# Patient Record
Sex: FEMALE | Race: WHITE | NOT HISPANIC OR LATINO | URBAN - METROPOLITAN AREA
[De-identification: names, ages, dates, MRNs, and addresses within clinical notes are randomized per-mention and may not be internally consistent; named-entity substitution may affect disease eponyms.]

---

## 2023-05-17 ENCOUNTER — APPOINTMENT (OUTPATIENT)
Dept: URBAN - METROPOLITAN AREA CLINIC 186 | Age: 58
Setting detail: DERMATOLOGY
End: 2023-05-17

## 2023-05-17 DIAGNOSIS — Z41.9 ENCOUNTER FOR PROCEDURE FOR PURPOSES OTHER THAN REMEDYING HEALTH STATE, UNSPECIFIED: ICD-10-CM

## 2023-05-17 PROCEDURE — OTHER ADDITIONAL NOTES: OTHER

## 2023-05-17 PROCEDURE — OTHER COSMETIC CONSULTATION: THREAD LIFT: OTHER

## 2023-05-17 PROCEDURE — OTHER MIPS QUALITY: OTHER

## 2023-05-17 PROCEDURE — OTHER COSMETIC CONSULTATION: FILLERS: OTHER

## 2023-05-17 ASSESSMENT — LOCATION DETAILED DESCRIPTION DERM
LOCATION DETAILED: LEFT INFERIOR CENTRAL MALAR CHEEK
LOCATION DETAILED: RIGHT CENTRAL BUCCAL CHEEK
LOCATION DETAILED: LEFT INFERIOR MEDIAL MALAR CHEEK
LOCATION DETAILED: LEFT SUPERIOR MEDIAL MALAR CHEEK
LOCATION DETAILED: RIGHT LOWER CUTANEOUS LIP
LOCATION DETAILED: LEFT LOWER CUTANEOUS LIP
LOCATION DETAILED: RIGHT SUPERIOR MEDIAL MALAR CHEEK
LOCATION DETAILED: LEFT SUPERIOR MEDIAL MALAR CHEEK
LOCATION DETAILED: RIGHT SUPERIOR CENTRAL MALAR CHEEK
LOCATION DETAILED: LEFT CENTRAL BUCCAL CHEEK
LOCATION DETAILED: RIGHT INFERIOR MEDIAL MALAR CHEEK
LOCATION DETAILED: RIGHT INFERIOR CENTRAL MALAR CHEEK

## 2023-05-17 ASSESSMENT — LOCATION SIMPLE DESCRIPTION DERM
LOCATION SIMPLE: RIGHT LIP
LOCATION SIMPLE: LEFT CHEEK
LOCATION SIMPLE: RIGHT CHEEK
LOCATION SIMPLE: LEFT LIP
LOCATION SIMPLE: LEFT CHEEK
LOCATION SIMPLE: RIGHT CHEEK

## 2023-05-17 ASSESSMENT — LOCATION ZONE DERM
LOCATION ZONE: FACE
LOCATION ZONE: LIP
LOCATION ZONE: FACE

## 2023-05-17 NOTE — PROCEDURE: ADDITIONAL NOTES
Detail Level: Simple
Additional Notes: Patient wants to improve under eye bags. Discussed option for tear trough filler vs. facial plastic surgeon. Recommended plastic surgeon due to risks associated with tear trough filler. Pt request recommended plastic surgeon-Carlito Cook or Ines Owens-recommended pt have consult with more than one surgeon. Also discussed option for filler to marionette and hollowed temples-recommended doing this in addition to instalift
Render Risk Assessment In Note?: no
Additional Notes: Instalift 3 sutures per side $3500. Patient will consider options and get back to us if she decides to schedule
Additional Notes: We addressed the patient's main concern, bags under the eyes, nasolabial folds and jowling. We discussed the reasons, remedies from dermal filler and repostioning with microsuture technoloogy v face lift and dermal  tear trough and lateral soof v blepharoplasty.  We suggested Malachi Snyder or Ines Owens for the blepharolplasty consultation.  I think she is a good candidate for the InstaLift which will help main concerns of jowling and nasolabial and marionette area. I did mention post InstaLift, dermal filler marionette area would be ideal and chin augmentation which I did not mention would enhance the lower third of her face